# Patient Record
Sex: FEMALE | Race: BLACK OR AFRICAN AMERICAN | NOT HISPANIC OR LATINO | Employment: OTHER | URBAN - METROPOLITAN AREA
[De-identification: names, ages, dates, MRNs, and addresses within clinical notes are randomized per-mention and may not be internally consistent; named-entity substitution may affect disease eponyms.]

---

## 2023-12-04 PROBLEM — H93.299 IMPAIRMENT OF AUDITORY DISCRIMINATION: Status: ACTIVE | Noted: 2023-12-04

## 2023-12-04 PROBLEM — H90.71 MIXED HEARING LOSS OF RIGHT EAR: Status: ACTIVE | Noted: 2023-12-04

## 2023-12-04 PROBLEM — H61.20 EXCESSIVE CERUMEN IN EAR CANAL: Status: ACTIVE | Noted: 2023-12-04

## 2023-12-04 PROBLEM — R09.89 LABILE HYPERTENSION: Status: ACTIVE | Noted: 2023-12-04

## 2023-12-04 PROBLEM — H90.5 SENSORINEURAL HEARING LOSS OF LEFT EAR: Status: ACTIVE | Noted: 2023-12-04

## 2023-12-04 PROBLEM — H66.91 CHRONIC OTITIS MEDIA OF RIGHT EAR: Status: ACTIVE | Noted: 2023-12-04

## 2023-12-04 PROBLEM — J45.909 REACTIVE AIRWAY DISEASE (HHS-HCC): Status: ACTIVE | Noted: 2023-12-04

## 2023-12-04 PROBLEM — R51.9 FACE PAIN: Status: ACTIVE | Noted: 2023-12-04

## 2023-12-04 PROBLEM — J34.89 NASAL DRYNESS: Status: ACTIVE | Noted: 2023-12-04

## 2023-12-04 PROBLEM — H61.893 EAR CANAL DRYNESS, BILATERAL: Status: ACTIVE | Noted: 2023-12-04

## 2023-12-04 PROBLEM — H90.3 BILATERAL SENSORINEURAL HEARING LOSS: Status: ACTIVE | Noted: 2023-12-04

## 2023-12-04 RX ORDER — ANTHRALIN 1 G/100G
SHAMPOO TOPICAL
COMMUNITY
Start: 2015-06-02

## 2023-12-04 RX ORDER — ALBUTEROL SULFATE 90 UG/1
AEROSOL, METERED RESPIRATORY (INHALATION)
COMMUNITY
Start: 2014-12-19

## 2023-12-04 RX ORDER — METOPROLOL SUCCINATE 25 MG/1
TABLET, EXTENDED RELEASE ORAL
COMMUNITY
Start: 2019-01-28 | End: 2023-12-12 | Stop reason: ALTCHOICE

## 2023-12-04 RX ORDER — LEVOTHYROXINE SODIUM 100 UG/1
TABLET ORAL
COMMUNITY
Start: 2014-06-05 | End: 2023-12-12 | Stop reason: ALTCHOICE

## 2023-12-04 RX ORDER — ADALIMUMAB 40MG/0.8ML
KIT SUBCUTANEOUS
COMMUNITY
Start: 2017-07-11 | End: 2023-12-12 | Stop reason: ALTCHOICE

## 2023-12-04 RX ORDER — INSULIN ASPART 100 [IU]/ML
INJECTION, SOLUTION INTRAVENOUS; SUBCUTANEOUS
COMMUNITY
Start: 2015-08-03

## 2023-12-04 RX ORDER — FLUTICASONE PROPIONATE AND SALMETEROL 250; 50 UG/1; UG/1
POWDER RESPIRATORY (INHALATION)
COMMUNITY
Start: 2014-12-19

## 2023-12-04 RX ORDER — GLIMEPIRIDE 4 MG/1
TABLET ORAL
COMMUNITY
Start: 2014-02-16 | End: 2023-12-12 | Stop reason: ALTCHOICE

## 2023-12-04 RX ORDER — ENALAPRIL MALEATE 10 MG/1
TABLET ORAL
COMMUNITY
Start: 2015-01-04

## 2023-12-04 RX ORDER — HYDROCODONE BITARTRATE AND ACETAMINOPHEN 5; 325 MG/1; MG/1
TABLET ORAL
COMMUNITY
Start: 2014-11-25

## 2023-12-04 RX ORDER — LORATADINE 10 MG/1
TABLET ORAL
COMMUNITY
Start: 2015-10-30

## 2023-12-04 RX ORDER — CALCIPOTRIENE 50 UG/G
CREAM TOPICAL
COMMUNITY
Start: 2018-10-11

## 2023-12-04 RX ORDER — AZELASTINE 1 MG/ML
SPRAY, METERED NASAL
COMMUNITY
Start: 2015-12-03

## 2023-12-04 RX ORDER — CHOLECALCIFEROL (VITAMIN D3) 125 MCG
CAPSULE ORAL
COMMUNITY
Start: 2015-10-30

## 2023-12-04 RX ORDER — ERGOCALCIFEROL 1.25 MG/1
CAPSULE ORAL
COMMUNITY
Start: 2014-11-25

## 2023-12-04 RX ORDER — METHOTREXATE 2.5 MG/1
TABLET ORAL
COMMUNITY
Start: 2015-06-02 | End: 2023-12-12 | Stop reason: ALTCHOICE

## 2023-12-04 RX ORDER — PREDNISOLONE ACETATE 10 MG/ML
SUSPENSION/ DROPS OPHTHALMIC
COMMUNITY
Start: 2015-10-01

## 2023-12-04 RX ORDER — KETOROLAC TROMETHAMINE 5 MG/ML
SOLUTION OPHTHALMIC
COMMUNITY
Start: 2015-10-01

## 2023-12-04 RX ORDER — FLUTICASONE PROPIONATE 50 MCG
SPRAY, SUSPENSION (ML) NASAL
COMMUNITY
Start: 2015-10-30

## 2023-12-04 RX ORDER — MONTELUKAST SODIUM 10 MG/1
TABLET ORAL
COMMUNITY
Start: 2015-10-30

## 2023-12-04 RX ORDER — HYDROCHLOROTHIAZIDE 25 MG/1
TABLET ORAL
COMMUNITY
Start: 2014-05-22

## 2023-12-04 RX ORDER — INSULIN GLARGINE 100 [IU]/ML
INJECTION, SOLUTION SUBCUTANEOUS
COMMUNITY
Start: 2014-08-18

## 2023-12-04 RX ORDER — FLUOCINOLONE ACETONIDE 0.11 MG/ML
OIL AURICULAR (OTIC)
COMMUNITY
Start: 2015-10-30

## 2023-12-10 NOTE — PROGRESS NOTES
Subjective   Patient ID: Corinne B Sanders is a 83 y.o. female who presents for New Patient Visit and Dizziness.  HPI  This 83-year-old female last seen in this office in June 2019 is being seen today for difficulties with imbalance.  She does have a history of asymmetric right-sided hearing loss labial hypertension intermittent reactive airway disease and sensorineural hearing loss both ears.  When last seen in this office she had very labile blood pressure and was advised to go to the emergency room or her PCP for treatment  Review of Systems    Objective   Physical Exam  EXAMINATION:      GENERAL LALA.EARANCE: Alert, in no acute distress, normal pitch and clarity of voice, overweight, cooperative.     HEAD/FACE: Normocephalic, atraumatic, normal facial movements and strength, no no tenderness to palpation, no lesions noted.     SKIN: Normal turgor, no raised or ulcerative lesions, warm and dry to palpation in the head and neck, she does have findings of psoriasis around the elbows     EYES: Extraocular motions intact, no nystagmus noted, pupils equal and reactive to light and accommodation, no conjunctivitis.     EARS: Both ears--external ear anatomy is normal without lesions, the right ear canal is obstructed by dry skin and ceruminous debris which is removed, auditory canals are patent and without skin abrasions or lesions, mild cerumen was removed from the left ear with a wax loop, hearing is intact to voice, tympanic membranes are intact with no acute inflammation, light reflexes present, no effusions are noted and no mastoid tenderness found to palpation.     NOSE: No external skin lesions are noted, nares are patent, septum is intact, sinuses are nontender to palpation bilaterally, no intranasal lesions or inflammation is noted, nasal valve is normal.     OROPHARYNX/ORAL CAVITY: Oropharynx is not inflamed and is without lesions, mucosa of the oral cavity is intact and without lesions, large tongue is midline  and mobile, upper and lower plates, TMJs are mobile     NECK: No lymphadenopathy is palpated, carotid pulses are intact, neck is supple with full range of motion, no thyroid abnormalities are noted, trachea is midline, no neck masses are palpated. No bruits are detected to auscultation, there was no jugular venous distention noted     Heart-- regular rhythm and rate was noted, possible gallop sound was noted     Lung-- auscultation revealed no rales or rhonchi     LYMPHATICS: No cervical adenopathy or supraclavicular adenopathy is palpated.     NEUROLOGIC/PSYCH; alert and oriented, cranial nerves are grossly intact, gait is without falling and supported by a cane, no motor deficits are noted.    Her audiogram today revealed a mild low-frequency hearing loss up to 1000 Hz for both ears mild still at 2000 Hz on the left ear.  Moderate hearing loss is noted in the remaining mid to upper frequencies of sound.  There is slight change noted in the right ear 250 Hz and 8000 Hz.  Discriminate scores 100% at 60 dB presentation level.  Tympanograms were normal    Patient ID: Corinne B Sanders is a 83 y.o. female.    Ear cerumen removal    Date/Time: 12/12/2023 3:44 PM    Performed by: Eric Mercado DMD, MD  Authorized by: Eric Mercado DMD, MD    Consent:     Consent obtained:  Verbal    Consent given by:  Patient    Risks, benefits, and alternatives were discussed: yes      Risks discussed:  Incomplete removal, pain, infection, dizziness and bleeding    Alternatives discussed:  Observation  Post-procedure details:     Procedure completion:  Tolerated well, no immediate complications  Comments:        Procedure Ear Cleaning    Consent:  The planned procedure including the risks as well as alternatives of treatments were discussed and verbal consent obtained.    Procedure: Using otoscopic techniques, cerumen is removed with a wax loop from both ear canals.    Findings:  The external auditory canals are without  inflammation or lesions and both tympanic members are normal in appearance with no evidence for middle ear effusion or signs of infection. No mastoid tenderness is noted. The patient tolerated the procedure well.    Assessment/Plan   Problem List Items Addressed This Visit             ICD-10-CM    Bilateral sensorineural hearing loss H90.3    Ear canal dryness, bilateral H61.893    Impairment of auditory discrimination H93.299    Nasal dryness J34.89     Other Visit Diagnoses         Codes    Bilateral impacted cerumen    -  Primary H61.23    Allergic rhinitis, unspecified seasonality, unspecified trigger     J30.9    Relevant Medications    fluticasone (Flonase) 50 mcg/actuation nasal spray          I discussed the clinical finds with the patient.  From an nasal allergy standpoint I recommend that she be back on fluticasone nasal spray once daily to both sides of the nose in conjunction with an antihistamine of choice such as Zyrtec over-the-counter.  Nasal saline washes would be advised as well.  Symptoms of pain, purulent discharge, fever suggesting a respiratory infection should be checked and treated by her PCP or by calling the office.  It does appear that she has been having some strain in the posterior nape of neck muscles which can be associated with cervical spine issues.  This would account for pain in the region of the nape of the neck and if there is any radicular pain it could cause discomfort in the anterior neck shoulder and arm.  If any those types of symptoms develop she should alert her PCP to have that evaluated.  It appears that she had some symptoms of positional vertigo which has resolved although we talked about that types of symptoms that would develop and if she develops symptoms she is to contact the office.  I answered her questions in detail and we will see her back again in 1 year unless there is problems that develop during the year.  She is a candidate for amplification of her hearing  if she wishes to improve function and she could be seen by our audiologist here in the office to initiate that if she is motivated to pursue it.       Eric Mercado DMD, MD 12/12/23 4:13 PM

## 2023-12-12 ENCOUNTER — OFFICE VISIT (OUTPATIENT)
Dept: OTOLARYNGOLOGY | Facility: CLINIC | Age: 83
End: 2023-12-12
Payer: MEDICARE

## 2023-12-12 ENCOUNTER — CLINICAL SUPPORT (OUTPATIENT)
Dept: AUDIOLOGY | Facility: CLINIC | Age: 83
End: 2023-12-12
Payer: MEDICARE

## 2023-12-12 VITALS — HEIGHT: 67 IN | WEIGHT: 250 LBS | BODY MASS INDEX: 39.24 KG/M2

## 2023-12-12 DIAGNOSIS — H90.3 SENSORINEURAL HEARING LOSS (SNHL) OF BOTH EARS: ICD-10-CM

## 2023-12-12 DIAGNOSIS — J34.89 NASAL DRYNESS: ICD-10-CM

## 2023-12-12 DIAGNOSIS — J30.9 ALLERGIC RHINITIS, UNSPECIFIED SEASONALITY, UNSPECIFIED TRIGGER: ICD-10-CM

## 2023-12-12 DIAGNOSIS — H90.3 BILATERAL SENSORINEURAL HEARING LOSS: ICD-10-CM

## 2023-12-12 DIAGNOSIS — H61.23 BILATERAL IMPACTED CERUMEN: Primary | ICD-10-CM

## 2023-12-12 DIAGNOSIS — H93.293 IMPAIRMENT OF AUDITORY DISCRIMINATION OF BOTH EARS: ICD-10-CM

## 2023-12-12 DIAGNOSIS — R42 DIZZINESS: Primary | ICD-10-CM

## 2023-12-12 DIAGNOSIS — H61.893 EAR CANAL DRYNESS, BILATERAL: ICD-10-CM

## 2023-12-12 PROBLEM — H35.351 MACULAR EDEMA, CYSTOID, RIGHT: Status: ACTIVE | Noted: 2019-04-08

## 2023-12-12 PROBLEM — H52.203 MYOPIA WITH ASTIGMATISM AND PRESBYOPIA, BILATERAL: Status: ACTIVE | Noted: 2019-05-10

## 2023-12-12 PROBLEM — M51.36 DEGENERATION OF LUMBAR INTERVERTEBRAL DISC: Status: ACTIVE | Noted: 2023-12-12

## 2023-12-12 PROBLEM — M51.369 DEGENERATION OF LUMBAR INTERVERTEBRAL DISC: Status: ACTIVE | Noted: 2023-12-12

## 2023-12-12 PROBLEM — M19.019 PRIMARY LOCALIZED OSTEOARTHROSIS OF SHOULDER REGION: Status: ACTIVE | Noted: 2023-12-12

## 2023-12-12 PROBLEM — H49.22 CRANIAL NERVE VI PALSY, LEFT: Status: ACTIVE | Noted: 2019-04-08

## 2023-12-12 PROBLEM — M17.0 ARTHRITIS OF BOTH KNEES: Status: ACTIVE | Noted: 2023-12-12

## 2023-12-12 PROBLEM — M50.30 DEGENERATION OF CERVICAL INTERVERTEBRAL DISC: Status: ACTIVE | Noted: 2023-12-12

## 2023-12-12 PROBLEM — S52.502A CLOSED FRACTURE OF LEFT DISTAL RADIUS: Status: ACTIVE | Noted: 2018-06-06

## 2023-12-12 PROBLEM — H34.8110 CENTRAL RETINAL VEIN OCCLUSION WITH MACULAR EDEMA OF RIGHT EYE (CMS-HCC): Status: ACTIVE | Noted: 2019-04-08

## 2023-12-12 PROBLEM — E11.3213 BOTH EYES AFFECTED BY MILD NONPROLIFERATIVE DIABETIC RETINOPATHY WITH MACULAR EDEMA, ASSOCIATED WITH TYPE 2 DIABETES MELLITUS (MULTI): Status: ACTIVE | Noted: 2019-04-08

## 2023-12-12 PROBLEM — H52.4 MYOPIA WITH ASTIGMATISM AND PRESBYOPIA, BILATERAL: Status: ACTIVE | Noted: 2019-05-10

## 2023-12-12 PROBLEM — H52.13 MYOPIA WITH ASTIGMATISM AND PRESBYOPIA, BILATERAL: Status: ACTIVE | Noted: 2019-05-10

## 2023-12-12 PROBLEM — E11.9 DIABETES MELLITUS (MULTI): Status: ACTIVE | Noted: 2023-06-29

## 2023-12-12 PROBLEM — H35.363 MACULAR DRUSEN, BILATERAL: Status: ACTIVE | Noted: 2019-04-08

## 2023-12-12 PROCEDURE — 1159F MED LIST DOCD IN RCRD: CPT | Performed by: OTOLARYNGOLOGY

## 2023-12-12 PROCEDURE — 92567 TYMPANOMETRY: CPT | Performed by: AUDIOLOGIST

## 2023-12-12 PROCEDURE — 92557 COMPREHENSIVE HEARING TEST: CPT | Performed by: AUDIOLOGIST

## 2023-12-12 PROCEDURE — 69210 REMOVE IMPACTED EAR WAX UNI: CPT | Performed by: OTOLARYNGOLOGY

## 2023-12-12 PROCEDURE — 99204 OFFICE O/P NEW MOD 45 MIN: CPT | Performed by: OTOLARYNGOLOGY

## 2023-12-12 PROCEDURE — 1036F TOBACCO NON-USER: CPT | Performed by: OTOLARYNGOLOGY

## 2023-12-12 RX ORDER — IRBESARTAN 150 MG/1
TABLET ORAL
COMMUNITY

## 2023-12-12 RX ORDER — FLUTICASONE PROPIONATE 50 MCG
SPRAY, SUSPENSION (ML) NASAL
Qty: 9.9 ML | Refills: 3 | Status: SHIPPED | OUTPATIENT
Start: 2023-12-12 | End: 2024-01-04

## 2023-12-12 RX ORDER — LEVOTHYROXINE SODIUM 75 UG/1
75 TABLET ORAL DAILY
COMMUNITY
Start: 2023-10-05

## 2023-12-12 RX ORDER — ASPIRIN 81 MG/1
1 TABLET ORAL DAILY
COMMUNITY

## 2023-12-12 RX ORDER — INDAPAMIDE 2.5 MG/1
TABLET ORAL
COMMUNITY

## 2023-12-12 RX ORDER — EZETIMIBE 10 MG/1
TABLET ORAL
COMMUNITY
Start: 2023-03-08

## 2023-12-12 RX ORDER — FOLIC ACID 1 MG/1
TABLET ORAL
COMMUNITY
Start: 2016-02-29

## 2023-12-12 RX ORDER — ASPIRIN 325 MG
TABLET, DELAYED RELEASE (ENTERIC COATED) ORAL
COMMUNITY

## 2023-12-12 RX ORDER — GUAIFENESIN 1200 MG
TABLET, EXTENDED RELEASE 12 HR ORAL
COMMUNITY

## 2023-12-12 NOTE — PROGRESS NOTES
COMPREHENSIVE AUDIOMETRIC EVALUATION      Name:  Corinne B Sanders  :  1940  Age:  83 y.o.  Date of Evaluation:  23   Referring Provider:  Dr. Mercado     History:  Ms. Fonseca was seen today  for an evaluation of hearing.  Patient reported decreased hearing sensitivity, bilaterally, imbalance at night when getting up, medical history significant for diabetes, and history of chemotherapy for thyroid cancer in the 1970s or 1980s.  Patient noted that her imbalance has been getting better over time.  When asked, patient denied otalgia, aural fullness, tinnitus, radiation    See audiometric evaluation at end of this report or scanned under media tab    OTOSCOPY:       Right Ear: Clear canal       Left Ear: Clear canal    226 Hz TYMPANOMETRY:       Right Ear: Type A: normal peak pressure, compliance, and ear canal volume, consistent with middle ear function within normal limits       Left Ear: Type A: normal peak pressure, compliance, and ear canal volume, consistent with middle ear function within normal limits    AUDIOMETRIC EVALUATION (Phones):       Right Ear: Mild sloping to Profound Sensorineural hearing loss                 Left Ear: Normal sloping to Severe Sensorineural hearing loss           NOTE: Decreased hearing sensitivity in the right ear at 250 Hz and 8000 Hz as compared to 2018 evaluation; L hearing sensitivity stable    Test technique:  Standard Audiometry  Reliability:   good    SPEECH RECOGNITION THRESHOLD:       Right Ear:  25 dBHL in good agreement with PTA       Left Ear:  25 dBHL in good agreement with PTA    WORD RECOGNITION:       Right Ear:  excellent (100%) at elevated presentation level       Left Ear:  excellent (100%) at elevated presentation level    DISCUSSION:   Discussed results and recommendations with patient.  Questions were addressed and the patient was encouraged to contact our department should concerns arise.    RECOMMENDATIONS:  -Recommend patient return for  hearing aid evaluation  -Recommend patient return in approximately a year for monitoring of hearing sensitivity due to dx of diabetes and known relation between diabetes and hearing loss    Joaquim Castanon, CCC-A     Appt: 2:30 - 3:00 PM

## 2024-01-04 DIAGNOSIS — J30.9 ALLERGIC RHINITIS, UNSPECIFIED SEASONALITY, UNSPECIFIED TRIGGER: ICD-10-CM

## 2024-01-04 DIAGNOSIS — J31.0 CHRONIC RHINITIS: Primary | ICD-10-CM

## 2024-01-04 DIAGNOSIS — J31.0 CHRONIC RHINITIS: ICD-10-CM

## 2024-01-04 RX ORDER — FLUTICASONE PROPIONATE 50 MCG
SPRAY, SUSPENSION (ML) NASAL
Qty: 48 ML | Refills: 3 | Status: SHIPPED | OUTPATIENT
Start: 2024-01-04

## 2024-01-04 RX ORDER — FLUTICASONE PROPIONATE 50 MCG
SPRAY, SUSPENSION (ML) NASAL
Qty: 48 ML | Refills: 3 | Status: SHIPPED | OUTPATIENT
Start: 2024-01-04 | End: 2024-01-04

## 2024-12-17 ENCOUNTER — APPOINTMENT (OUTPATIENT)
Dept: OTOLARYNGOLOGY | Facility: CLINIC | Age: 84
End: 2024-12-17
Payer: MEDICARE

## 2024-12-17 ENCOUNTER — APPOINTMENT (OUTPATIENT)
Dept: AUDIOLOGY | Facility: CLINIC | Age: 84
End: 2024-12-17
Payer: MEDICARE

## 2025-02-15 NOTE — PROGRESS NOTES
Subjective   Patient ID: Corinne B Sanders is a 84 y.o. female who presents for No chief complaint on file..  HPI  This 84-year-old female last seen in the office in December 2023 is being seen in follow-up.  At that time she was having difficulties with imbalance.  She has a history of asymmetric sensorineural hearing loss lower on the right.  Previously she had difficulties with labile blood pressure causing instability.  Review of Systems   A 12 point ROS  has been reviewed and are negative for complaint except for what is stated in the history of present illness and /or for past medical history as noted in the EMR.    Past Medical History:   Diagnosis Date    Allergy status to unspecified drugs, medicaments and biological substances     History of seasonal allergies    Disorder of thyroid, unspecified     Thyroid trouble    Other hemoglobinopathies (CMS-HCC)     Hemoglobin C (Hb-C)    Personal history of diseases of the blood and blood-forming organs and certain disorders involving the immune mechanism     History of thalassemia    Personal history of diseases of the skin and subcutaneous tissue     History of psoriatic arthritis    Personal history of other diseases of the circulatory system     History of hypertension    Personal history of other diseases of the respiratory system     History of asthma    Personal history of other endocrine, nutritional and metabolic disease     History of diabetes mellitus    Vitamin B12 deficiency anemia due to intrinsic factor deficiency     Anemia, pernicious          Current Outpatient Medications:     acetaminophen (Tylenol) 325 mg capsule, Take by mouth., Disp: , Rfl:     albuterol (ProAir HFA) 90 mcg/actuation inhaler, Inhale., Disp: , Rfl:     anthralin micronized (ZithranoL) 1 % shampoo, Zithranol 1 % External Shampoo  Quantity: 85  Refills: 0      Start : 2-Jun-2015  Active, Disp: , Rfl:     aspirin 81 mg EC tablet, Take 1 tablet (81 mg) by mouth once daily., Disp:  , Rfl:     azelastine (Astelin) 137 mcg (0.1 %) nasal spray, Administer into affected nostril(s)., Disp: , Rfl:     calcipotriene (Dovonex) 0.005 % cream, Calcipotriene 0.005 % External Cream  Quantity: 120  Refills: 0      Start : 11-Oct-2018  Active, Disp: , Rfl:     cholecalciferol (Vitamin D-3) 50,000 unit capsule, Take by mouth., Disp: , Rfl:     enalapril (Vasotec) 10 mg tablet, Take by mouth., Disp: , Rfl:     ergocalciferol (Vitamin D-2) 1.25 MG (73787 UT) capsule, Take by mouth., Disp: , Rfl:     ezetimibe (Zetia) 10 mg tablet, for 90 Days, Disp: , Rfl:     fluocinolone (DermOtic) 0.01 % ear drops, Administer into affected ear(s)., Disp: , Rfl:     fluticasone (Flonase) 50 mcg/actuation nasal spray, Administer into affected nostril(s)., Disp: , Rfl:     fluticasone (Flonase) 50 mcg/actuation nasal spray, Shake gently.  2 sprays each nostril daily, clean tip after use, Disp: 48 mL, Rfl: 3    fluticasone propion-salmeteroL (Advair Diskus) 250-50 mcg/dose diskus inhaler, Inhale., Disp: , Rfl:     folic acid (Folvite) 1 mg tablet, once daily., Disp: , Rfl:     hydroCHLOROthiazide (HYDRODiuril) 25 mg tablet, Take by mouth., Disp: , Rfl:     HYDROcodone-acetaminophen (Norco) 5-325 mg tablet, Take by mouth., Disp: , Rfl:     indapamide (Lozol) 2.5 mg tablet, for 90 Days, Disp: , Rfl:     insulin aspart (NovoLOG Flexpen U-100 Insulin) 100 unit/mL (3 mL) pen, Inject under the skin., Disp: , Rfl:     insulin glargine (Lantus Solostar U-100 Insulin) 100 unit/mL (3 mL) pen, Inject under the skin., Disp: , Rfl:     irbesartan (Avapro) 150 mg tablet, for 90 Days, Disp: , Rfl:     ketorolac (Acular) 0.5 % ophthalmic solution, Administer into affected eye(s)., Disp: , Rfl:     loratadine (Claritin) 10 mg tablet, Take by mouth., Disp: , Rfl:     montelukast (Singulair) 10 mg tablet, Take by mouth., Disp: , Rfl:     naproxen sodium (Aleve) 220 mg capsule, Take by mouth., Disp: , Rfl:     prednisoLONE acetate (Pred-Forte) 1 %  ophthalmic suspension, Administer into affected eye(s)., Disp: , Rfl:     Synthroid 75 mcg tablet, Take 1 tablet (75 mcg) by mouth once daily., Disp: , Rfl:      Allergies   Allergen Reactions    Losartan Anaphylaxis    Sulfa (Sulfonamide Antibiotics) Other, Anaphylaxis and Hives    Diphenhydramine Itching and Rash       There were no vitals taken for this visit.    Objective   Physical Exam  EXAMINATION:      GENERAL LLAA.EARANCE: Alert, in no acute distress, normal pitch and clarity of voice, overweight, cooperative.     HEAD/FACE: Normocephalic, atraumatic, normal facial movements and strength, no no tenderness to palpation, no lesions noted.     SKIN: Normal turgor, no raised or ulcerative lesions, warm and dry to palpation in the head and neck, she does have findings of psoriasis around the elbows     EYES: Extraocular motions intact, no nystagmus noted, pupils equal and reactive to light and accommodation, no conjunctivitis.     EARS: Both ears--external ear anatomy is normal without lesions, the right ear canal is obstructed by dry skin and ceruminous debris which is removed, auditory canals are patent and without skin abrasions or lesions, mild cerumen was removed from the left ear with a wax loop, hearing is intact to voice, tympanic membranes are intact with no acute inflammation, light reflexes present, no effusions are noted and no mastoid tenderness found to palpation.     NOSE: No external skin lesions are noted, nares are patent, septum is intact, sinuses are nontender to palpation bilaterally, no intranasal lesions or inflammation is noted, nasal valve is normal.     OROPHARYNX/ORAL CAVITY: Oropharynx is not inflamed and is without lesions, mucosa of the oral cavity is intact and without lesions, large tongue is midline and mobile, upper and lower plates, TMJs are mobile     NECK: No lymphadenopathy is palpated, carotid pulses are intact, neck is supple with full range of motion, no thyroid  abnormalities are noted, trachea is midline, no neck masses are palpated. No bruits are detected to auscultation, there was no jugular venous distention noted     Heart-- regular rhythm and rate was noted, possible gallop sound was noted     Lung-- auscultation revealed no rales or rhonchi     LYMPHATICS: No cervical adenopathy or supraclavicular adenopathy is palpated.     NEUROLOGIC/PSYCH; alert and oriented, cranial nerves are grossly intact, gait is without falling and supported by a cane, no motor deficits are noted.    Her audiogram today continues to show on the left ear and a mild hearing loss up through 4000 Hz moderate to moderately severe in the remaining upper frequencies of sound.  Right side has mild hearing loss up to 1000 Hz moderate in the remaining frequencies except for 8000 Hz where there is a moderately severe loss.  Discrimination scores are 100% on the right at 75 dB 90% on the left at 70 dB.  Tympanograms were normal      Assessment/Plan   Problem List Items Addressed This Visit             ICD-10-CM    Bilateral sensorineural hearing loss - Primary H90.3    Ear canal dryness, bilateral H61.893    Nasal dryness J34.89        I discussed the present hearing test findings with the patient. Since the last test there has been no significant change in the hearing of individual frequencies sound. Discrimination ability remains basically unchanged. It would be advised that a yearly audiogram be done unless symptoms develop in regards to progressive loss, new onset vertigo, or changes regarding tinnitus. Avoidance of loud noise without ear protection is advised. Rehabilitation using hearing aids is advised.    Both ear canals were somewhat on the dry side right more than left and she should use external hand cream around the canal opening and if there is any crusting or fissuring or discomfort bacitracin or Neosporin ointment twice a day for 1 week would be advised.    Eric Mercado DMD, MD  02/15/25 12:00 PM

## 2025-02-19 ENCOUNTER — APPOINTMENT (OUTPATIENT)
Dept: AUDIOLOGY | Facility: CLINIC | Age: 85
End: 2025-02-19
Payer: MEDICARE

## 2025-02-19 ENCOUNTER — APPOINTMENT (OUTPATIENT)
Dept: OTOLARYNGOLOGY | Facility: CLINIC | Age: 85
End: 2025-02-19
Payer: MEDICARE

## 2025-02-19 DIAGNOSIS — J34.89 NASAL DRYNESS: ICD-10-CM

## 2025-02-19 DIAGNOSIS — H90.3 SENSORINEURAL HEARING LOSS (SNHL) OF BOTH EARS: Primary | ICD-10-CM

## 2025-02-19 DIAGNOSIS — H61.893 EAR CANAL DRYNESS, BILATERAL: ICD-10-CM

## 2025-02-19 DIAGNOSIS — H90.3 BILATERAL SENSORINEURAL HEARING LOSS: Primary | ICD-10-CM

## 2025-02-19 PROCEDURE — 92567 TYMPANOMETRY: CPT | Performed by: AUDIOLOGIST

## 2025-02-19 PROCEDURE — 92557 COMPREHENSIVE HEARING TEST: CPT | Performed by: AUDIOLOGIST

## 2025-02-19 PROCEDURE — 99214 OFFICE O/P EST MOD 30 MIN: CPT | Performed by: OTOLARYNGOLOGY

## 2025-02-19 PROCEDURE — 1159F MED LIST DOCD IN RCRD: CPT | Performed by: OTOLARYNGOLOGY

## 2025-02-19 PROCEDURE — 1160F RVW MEDS BY RX/DR IN RCRD: CPT | Performed by: OTOLARYNGOLOGY

## 2025-02-19 PROCEDURE — 1036F TOBACCO NON-USER: CPT | Performed by: OTOLARYNGOLOGY

## 2025-02-19 PROCEDURE — 1126F AMNT PAIN NOTED NONE PRSNT: CPT | Performed by: OTOLARYNGOLOGY

## 2025-02-19 ASSESSMENT — PATIENT HEALTH QUESTIONNAIRE - PHQ9
2. FEELING DOWN, DEPRESSED OR HOPELESS: NOT AT ALL
SUM OF ALL RESPONSES TO PHQ9 QUESTIONS 1 AND 2: 0
1. LITTLE INTEREST OR PLEASURE IN DOING THINGS: NOT AT ALL

## 2025-02-19 ASSESSMENT — PAIN SCALES - GENERAL: PAINLEVEL_OUTOF10: 0-NO PAIN

## 2025-02-19 ASSESSMENT — ENCOUNTER SYMPTOMS
OCCASIONAL FEELINGS OF UNSTEADINESS: 0
DEPRESSION: 0
LOSS OF SENSATION IN FEET: 0

## 2025-02-19 ASSESSMENT — COLUMBIA-SUICIDE SEVERITY RATING SCALE - C-SSRS: 1. IN THE PAST MONTH, HAVE YOU WISHED YOU WERE DEAD OR WISHED YOU COULD GO TO SLEEP AND NOT WAKE UP?: NO

## 2025-02-19 NOTE — PROGRESS NOTES
COMPREHENSIVE AUDIOMETRIC EVALUATION      Name:  Corinne B Sanders  :  1940  Age:  84 y.o.  Date of Evaluation:  25   Referring Provider:  Eric Mercado DMD, MD     History:  Ms. Fonseca was seen today for an evaluation of hearing. Please recall, patient has a known sensorineural hearing loss, bilaterally, medical history significant for diabetes, and history of chemotherapy for thyroid cancer in the 1970s or 1980s.  Patient does not utilize a hearing aid.  She arrived to today's appointment utilizing a walker. When asked, patient denied otalgia, aural fullness, and concerns for decreased hearing sensitivity    See audiometric evaluation at end of this report or scanned under media tab    OTOSCOPY:       Right Ear: Blood in inferior portion of canal with minimal non occluding cerumen       Left Ear: Clear canal    226 Hz TYMPANOMETRY:       Right Ear: Type A: normal peak pressure, compliance, and ear canal volume, consistent with middle ear function within normal limits       Left Ear: Type A: normal peak pressure, compliance, and ear canal volume, consistent with middle ear function within normal limits    AUDIOMETRIC EVALUATION (Phones):       Right Ear: Mild through 1000 Hz sloping to Severe, Sensorineural hearing loss                 Left Ear: Mild through 2000 Hz sloping to Severe, Sensorineural hearing loss           NOTE: Hearing sensitivity improved in the right ear at 8000 Hz as compared to most recent audiometric evaluation 2023    Test technique:  Standard Audiometry  Reliability:   good    SPEECH RECOGNITION THRESHOLD:       Right Ear:  35 dBHL in good agreement with PTA       Left Ear:  30 dBHL in good agreement with PTA    WORD RECOGNITION:       Right Ear:  100%  excellent (%) at elevated presentation level       Left Ear:   90%  excellent (%) at elevated presentation level  NOTE: Patient was very quiet during word recognition testing and answers were interpreted to  best of clinician ability.    DISCUSSION:   Discussed results and recommendations with patient.  Questions were addressed and the patient was encouraged to contact our department should concerns arise.    RECOMMENDATIONS:  -Recommend patient return for hearing aid evaluation.  -Recommend patient return for repeated audiometric evaluation should concerns for changes in hearing sensitivity arise or as medically indicated.    Joaquim Castanon, CCC-A     Appt: 10:30 - 11:00 AM

## 2026-03-03 ENCOUNTER — APPOINTMENT (OUTPATIENT)
Dept: OTOLARYNGOLOGY | Facility: CLINIC | Age: 86
End: 2026-03-03
Payer: MEDICARE

## 2026-03-03 ENCOUNTER — APPOINTMENT (OUTPATIENT)
Dept: AUDIOLOGY | Facility: CLINIC | Age: 86
End: 2026-03-03
Payer: MEDICARE